# Patient Record
Sex: FEMALE | Race: WHITE | NOT HISPANIC OR LATINO
[De-identification: names, ages, dates, MRNs, and addresses within clinical notes are randomized per-mention and may not be internally consistent; named-entity substitution may affect disease eponyms.]

---

## 2017-03-07 ENCOUNTER — APPOINTMENT (OUTPATIENT)
Dept: OBGYN | Facility: CLINIC | Age: 27
End: 2017-03-07

## 2020-02-13 ENCOUNTER — APPOINTMENT (OUTPATIENT)
Dept: INTERNAL MEDICINE | Facility: CLINIC | Age: 30
End: 2020-02-13
Payer: COMMERCIAL

## 2020-02-13 VITALS
OXYGEN SATURATION: 98 % | SYSTOLIC BLOOD PRESSURE: 110 MMHG | TEMPERATURE: 98.4 F | HEART RATE: 76 BPM | DIASTOLIC BLOOD PRESSURE: 60 MMHG | HEIGHT: 61 IN

## 2020-02-13 PROCEDURE — 99385 PREV VISIT NEW AGE 18-39: CPT

## 2020-02-13 NOTE — ASSESSMENT
[FreeTextEntry1] : 29F, 9 weeks pregnant, with PMH of IBS presents for a first time visit to establish care.

## 2020-02-13 NOTE — HISTORY OF PRESENT ILLNESS
[FreeTextEntry1] : wellness visit [de-identified] : 29F, 9 weeks pregnant, with PMH of IBS (constipation type) presents for a first time visit to establish care. She is currently 9 weeks pregnant for the first time and is being appropriately followed by OB/GYN, Dr. Bravo. She says that currently she has no complaints or concerns and is feeling well. She denies symptoms of abdominal pain, cramping, nausea, or vomiting. She says that she used to take linzess for her IBS constipation but stopped since she became pregnant and is now controlling her diet. Otherwise, she is taking her prenatals and is currently not on any other medications.

## 2020-02-13 NOTE — PLAN
[FreeTextEntry1] : # pregnancy\par - currently 9 weeks pregnant and follows with OB/GYN, Dr. Bravo. Next visit being next week\par - denies any abdominal pain, cramping, nausea and vomiting\par - on prenatals\par \par # IBS\par - previously on linzess but stopped since she got pregnant\par - modifiable with diet changes, reports normal bowel movements every day now\par \par # HCM\par - flu shot done this year\par

## 2020-02-13 NOTE — PHYSICAL EXAM
[Normal] : normal rate, regular rhythm, normal S1 and S2 and no murmur heard [de-identified] : mild distention, consistant with pregnancy

## 2020-09-16 ENCOUNTER — TRANSCRIPTION ENCOUNTER (OUTPATIENT)
Age: 30
End: 2020-09-16

## 2020-09-17 ENCOUNTER — RESULT REVIEW (OUTPATIENT)
Age: 30
End: 2020-09-17

## 2020-09-17 ENCOUNTER — INPATIENT (INPATIENT)
Facility: HOSPITAL | Age: 30
LOS: 3 days | Discharge: HOME | End: 2020-09-21
Attending: OBSTETRICS & GYNECOLOGY | Admitting: OBSTETRICS & GYNECOLOGY
Payer: COMMERCIAL

## 2020-09-17 VITALS — WEIGHT: 138.89 LBS | HEIGHT: 61 IN

## 2020-09-17 LAB
ALBUMIN SERPL ELPH-MCNC: 3.9 G/DL — SIGNIFICANT CHANGE UP (ref 3.3–5)
ALP SERPL-CCNC: 116 U/L — SIGNIFICANT CHANGE UP (ref 40–120)
ALT FLD-CCNC: 12 U/L — SIGNIFICANT CHANGE UP (ref 10–45)
ANION GAP SERPL CALC-SCNC: 14 MMOL/L — SIGNIFICANT CHANGE UP (ref 5–17)
AST SERPL-CCNC: 19 U/L — SIGNIFICANT CHANGE UP (ref 10–40)
BASOPHILS # BLD AUTO: 0.03 K/UL — SIGNIFICANT CHANGE UP (ref 0–0.2)
BASOPHILS NFR BLD AUTO: 0.3 % — SIGNIFICANT CHANGE UP (ref 0–2)
BILIRUB SERPL-MCNC: 0.3 MG/DL — SIGNIFICANT CHANGE UP (ref 0.2–1.2)
BLD GP AB SCN SERPL QL: NEGATIVE — SIGNIFICANT CHANGE UP
BUN SERPL-MCNC: 16 MG/DL — SIGNIFICANT CHANGE UP (ref 7–23)
CALCIUM SERPL-MCNC: 10.2 MG/DL — SIGNIFICANT CHANGE UP (ref 8.4–10.5)
CHLORIDE SERPL-SCNC: 104 MMOL/L — SIGNIFICANT CHANGE UP (ref 96–108)
CO2 SERPL-SCNC: 19 MMOL/L — LOW (ref 22–31)
CREAT ?TM UR-MCNC: 18 MG/DL — SIGNIFICANT CHANGE UP
CREAT SERPL-MCNC: 0.51 MG/DL — SIGNIFICANT CHANGE UP (ref 0.5–1.3)
EOSINOPHIL # BLD AUTO: 0.11 K/UL — SIGNIFICANT CHANGE UP (ref 0–0.5)
EOSINOPHIL NFR BLD AUTO: 1.1 % — SIGNIFICANT CHANGE UP (ref 0–6)
GLUCOSE SERPL-MCNC: 109 MG/DL — HIGH (ref 70–99)
HCT VFR BLD CALC: 40.1 % — SIGNIFICANT CHANGE UP (ref 34.5–45)
HGB BLD-MCNC: 13.8 G/DL — SIGNIFICANT CHANGE UP (ref 11.5–15.5)
IMM GRANULOCYTES NFR BLD AUTO: 0.5 % — SIGNIFICANT CHANGE UP (ref 0–1.5)
LDH SERPL L TO P-CCNC: 160 U/L — SIGNIFICANT CHANGE UP (ref 50–242)
LYMPHOCYTES # BLD AUTO: 1.44 K/UL — SIGNIFICANT CHANGE UP (ref 1–3.3)
LYMPHOCYTES # BLD AUTO: 13.8 % — SIGNIFICANT CHANGE UP (ref 13–44)
MCHC RBC-ENTMCNC: 32.9 PG — SIGNIFICANT CHANGE UP (ref 27–34)
MCHC RBC-ENTMCNC: 34.4 GM/DL — SIGNIFICANT CHANGE UP (ref 32–36)
MCV RBC AUTO: 95.7 FL — SIGNIFICANT CHANGE UP (ref 80–100)
MONOCYTES # BLD AUTO: 0.6 K/UL — SIGNIFICANT CHANGE UP (ref 0–0.9)
MONOCYTES NFR BLD AUTO: 5.7 % — SIGNIFICANT CHANGE UP (ref 2–14)
NEUTROPHILS # BLD AUTO: 8.22 K/UL — HIGH (ref 1.8–7.4)
NEUTROPHILS NFR BLD AUTO: 78.6 % — HIGH (ref 43–77)
NRBC # BLD: 0 /100 WBCS — SIGNIFICANT CHANGE UP (ref 0–0)
PLATELET # BLD AUTO: 193 K/UL — SIGNIFICANT CHANGE UP (ref 150–400)
POTASSIUM SERPL-MCNC: 3.7 MMOL/L — SIGNIFICANT CHANGE UP (ref 3.5–5.3)
POTASSIUM SERPL-SCNC: 3.7 MMOL/L — SIGNIFICANT CHANGE UP (ref 3.5–5.3)
PROT ?TM UR-MCNC: <4 MG/DL — SIGNIFICANT CHANGE UP (ref 0–12)
PROT SERPL-MCNC: 7.1 G/DL — SIGNIFICANT CHANGE UP (ref 6–8.3)
PROT/CREAT UR-RTO: <0.2 RATIO — SIGNIFICANT CHANGE UP (ref 0–0.2)
RBC # BLD: 4.19 M/UL — SIGNIFICANT CHANGE UP (ref 3.8–5.2)
RBC # FLD: 12.4 % — SIGNIFICANT CHANGE UP (ref 10.3–14.5)
RH IG SCN BLD-IMP: POSITIVE — SIGNIFICANT CHANGE UP
SARS-COV-2 RNA SPEC QL NAA+PROBE: SIGNIFICANT CHANGE UP
SODIUM SERPL-SCNC: 137 MMOL/L — SIGNIFICANT CHANGE UP (ref 135–145)
URATE SERPL-MCNC: 4.4 MG/DL — SIGNIFICANT CHANGE UP (ref 2.5–7)
WBC # BLD: 10.45 K/UL — SIGNIFICANT CHANGE UP (ref 3.8–10.5)
WBC # FLD AUTO: 10.45 K/UL — SIGNIFICANT CHANGE UP (ref 3.8–10.5)

## 2020-09-17 PROCEDURE — 88307 TISSUE EXAM BY PATHOLOGIST: CPT | Mod: 26

## 2020-09-17 RX ORDER — ENOXAPARIN SODIUM 100 MG/ML
40 INJECTION SUBCUTANEOUS EVERY 24 HOURS
Refills: 0 | Status: DISCONTINUED | OUTPATIENT
Start: 2020-09-18 | End: 2020-09-21

## 2020-09-17 RX ORDER — OXYCODONE HYDROCHLORIDE 5 MG/1
5 TABLET ORAL
Refills: 0 | Status: DISCONTINUED | OUTPATIENT
Start: 2020-09-17 | End: 2020-09-21

## 2020-09-17 RX ORDER — POLYETHYLENE GLYCOL 3350 17 G/17G
17 POWDER, FOR SOLUTION ORAL DAILY
Refills: 0 | Status: DISCONTINUED | OUTPATIENT
Start: 2020-09-17 | End: 2020-09-21

## 2020-09-17 RX ORDER — CHLORHEXIDINE GLUCONATE 213 G/1000ML
1 SOLUTION TOPICAL ONCE
Refills: 0 | Status: DISCONTINUED | OUTPATIENT
Start: 2020-09-17 | End: 2020-09-21

## 2020-09-17 RX ORDER — LANOLIN
1 OINTMENT (GRAM) TOPICAL EVERY 6 HOURS
Refills: 0 | Status: DISCONTINUED | OUTPATIENT
Start: 2020-09-17 | End: 2020-09-21

## 2020-09-17 RX ORDER — FAMOTIDINE 10 MG/ML
20 INJECTION INTRAVENOUS ONCE
Refills: 0 | Status: COMPLETED | OUTPATIENT
Start: 2020-09-17 | End: 2020-09-17

## 2020-09-17 RX ORDER — FENTANYL/BUPIVACAINE/NS/PF 2MCG/ML-.1
250 PLASTIC BAG, INJECTION (ML) INJECTION
Refills: 0 | Status: DISCONTINUED | OUTPATIENT
Start: 2020-09-17 | End: 2020-09-17

## 2020-09-17 RX ORDER — SODIUM CHLORIDE 9 MG/ML
1000 INJECTION, SOLUTION INTRAVENOUS
Refills: 0 | Status: DISCONTINUED | OUTPATIENT
Start: 2020-09-17 | End: 2020-09-21

## 2020-09-17 RX ORDER — OXYTOCIN 10 UNIT/ML
1 VIAL (ML) INJECTION
Qty: 30 | Refills: 0 | Status: DISCONTINUED | OUTPATIENT
Start: 2020-09-17 | End: 2020-09-21

## 2020-09-17 RX ORDER — DIPHENHYDRAMINE HCL 50 MG
25 CAPSULE ORAL EVERY 6 HOURS
Refills: 0 | Status: DISCONTINUED | OUTPATIENT
Start: 2020-09-17 | End: 2020-09-21

## 2020-09-17 RX ORDER — SIMETHICONE 80 MG/1
80 TABLET, CHEWABLE ORAL EVERY 4 HOURS
Refills: 0 | Status: DISCONTINUED | OUTPATIENT
Start: 2020-09-17 | End: 2020-09-21

## 2020-09-17 RX ORDER — OXYCODONE HYDROCHLORIDE 5 MG/1
5 TABLET ORAL ONCE
Refills: 0 | Status: DISCONTINUED | OUTPATIENT
Start: 2020-09-17 | End: 2020-09-21

## 2020-09-17 RX ORDER — KETOROLAC TROMETHAMINE 30 MG/ML
30 SYRINGE (ML) INJECTION EVERY 6 HOURS
Refills: 0 | Status: DISCONTINUED | OUTPATIENT
Start: 2020-09-17 | End: 2020-09-18

## 2020-09-17 RX ORDER — SODIUM CHLORIDE 9 MG/ML
1000 INJECTION, SOLUTION INTRAVENOUS
Refills: 0 | Status: DISCONTINUED | OUTPATIENT
Start: 2020-09-17 | End: 2020-09-17

## 2020-09-17 RX ORDER — CITRIC ACID/SODIUM CITRATE 300-500 MG
30 SOLUTION, ORAL ORAL ONCE
Refills: 0 | Status: COMPLETED | OUTPATIENT
Start: 2020-09-17 | End: 2020-09-17

## 2020-09-17 RX ORDER — TETANUS TOXOID, REDUCED DIPHTHERIA TOXOID AND ACELLULAR PERTUSSIS VACCINE, ADSORBED 5; 2.5; 8; 8; 2.5 [IU]/.5ML; [IU]/.5ML; UG/.5ML; UG/.5ML; UG/.5ML
0.5 SUSPENSION INTRAMUSCULAR ONCE
Refills: 0 | Status: DISCONTINUED | OUTPATIENT
Start: 2020-09-17 | End: 2020-09-21

## 2020-09-17 RX ORDER — CEFAZOLIN SODIUM 1 G
2000 VIAL (EA) INJECTION ONCE
Refills: 0 | Status: COMPLETED | OUTPATIENT
Start: 2020-09-17 | End: 2020-09-17

## 2020-09-17 RX ORDER — OXYTOCIN 10 UNIT/ML
VIAL (ML) INJECTION
Qty: 20 | Refills: 0 | Status: DISCONTINUED | OUTPATIENT
Start: 2020-09-17 | End: 2020-09-17

## 2020-09-17 RX ORDER — CITRIC ACID/SODIUM CITRATE 300-500 MG
30 SOLUTION, ORAL ORAL ONCE
Refills: 0 | Status: DISCONTINUED | OUTPATIENT
Start: 2020-09-17 | End: 2020-09-21

## 2020-09-17 RX ORDER — ACETAMINOPHEN 500 MG
975 TABLET ORAL
Refills: 0 | Status: DISCONTINUED | OUTPATIENT
Start: 2020-09-17 | End: 2020-09-21

## 2020-09-17 RX ORDER — MAGNESIUM HYDROXIDE 400 MG/1
30 TABLET, CHEWABLE ORAL
Refills: 0 | Status: DISCONTINUED | OUTPATIENT
Start: 2020-09-17 | End: 2020-09-21

## 2020-09-17 RX ORDER — IBUPROFEN 200 MG
600 TABLET ORAL EVERY 6 HOURS
Refills: 0 | Status: COMPLETED | OUTPATIENT
Start: 2020-09-17 | End: 2021-08-16

## 2020-09-17 RX ORDER — OXYTOCIN 10 UNIT/ML
333.33 VIAL (ML) INJECTION
Qty: 20 | Refills: 0 | Status: DISCONTINUED | OUTPATIENT
Start: 2020-09-17 | End: 2020-09-21

## 2020-09-17 RX ORDER — AZITHROMYCIN 500 MG/1
500 TABLET, FILM COATED ORAL ONCE
Refills: 0 | Status: COMPLETED | OUTPATIENT
Start: 2020-09-17 | End: 2020-09-17

## 2020-09-17 RX ADMIN — SODIUM CHLORIDE 125 MILLILITER(S): 9 INJECTION, SOLUTION INTRAVENOUS at 13:02

## 2020-09-17 RX ADMIN — Medication 100 MILLIGRAM(S): at 19:21

## 2020-09-17 RX ADMIN — FAMOTIDINE 20 MILLIGRAM(S): 10 INJECTION INTRAVENOUS at 19:32

## 2020-09-17 RX ADMIN — AZITHROMYCIN 255 MILLIGRAM(S): 500 TABLET, FILM COATED ORAL at 19:45

## 2020-09-17 RX ADMIN — Medication 30 MILLILITER(S): at 19:20

## 2020-09-17 RX ADMIN — Medication 1 MILLIUNIT(S)/MIN: at 17:38

## 2020-09-17 RX ADMIN — Medication 30 MILLIGRAM(S): at 23:43

## 2020-09-17 RX ADMIN — Medication 975 MILLIGRAM(S): at 23:49

## 2020-09-17 RX ADMIN — Medication 30 MILLIGRAM(S): at 22:43

## 2020-09-17 NOTE — PATIENT PROFILE OB - NUTRITION PROFILE
[Tinnitus - Grade 0] : Tinnitus - Grade 0 [Blurred Vision: Grade 0] : Blurred Vision: Grade 0 [Mucositis Oral: Grade 0] : Mucositis Oral: Grade 0  [Xerostomia: Grade 1 - Symptomatic (e.g., dry or thick saliva) without significant dietary alteration; unstimulated saliva flow >0.2 ml/min] : Xerostomia: Grade 1 - Symptomatic (e.g., dry or thick saliva) without significant dietary alteration; unstimulated saliva flow >0.2 ml/min [Oral Pain: Grade 1 - Mild pain] : Oral Pain: Grade 1 - Mild pain [Salivary duct inflammation: Grade 0] : Salivary duct inflammation: Grade 0 [Dysgeusia: Grade 0] : Dysgeusia: Grade 0 [Alopecia: Grade 0] : Alopecia: Grade 0 [Pruritus: Grade 0] : Pruritus: Grade 0 [Skin Atrophy: Grade 0] : Skin Atrophy: Grade 0 [Skin Hyperpigmentation: Grade 0] : Skin Hyperpigmentation: Grade 0 [Skin Induration: Grade 0] : Skin Induration: Grade 0 [Dermatitis Radiation: Grade 0] : Dermatitis Radiation: Grade 0 no indicators present

## 2020-09-18 ENCOUNTER — TRANSCRIPTION ENCOUNTER (OUTPATIENT)
Age: 30
End: 2020-09-18

## 2020-09-18 LAB
BASOPHILS # BLD AUTO: 0.02 K/UL — SIGNIFICANT CHANGE UP (ref 0–0.2)
BASOPHILS NFR BLD AUTO: 0.1 % — SIGNIFICANT CHANGE UP (ref 0–2)
EOSINOPHIL # BLD AUTO: 0 K/UL — SIGNIFICANT CHANGE UP (ref 0–0.5)
EOSINOPHIL NFR BLD AUTO: 0 % — SIGNIFICANT CHANGE UP (ref 0–6)
HCT VFR BLD CALC: 32.3 % — LOW (ref 34.5–45)
HGB BLD-MCNC: 11.2 G/DL — LOW (ref 11.5–15.5)
IMM GRANULOCYTES NFR BLD AUTO: 0.7 % — SIGNIFICANT CHANGE UP (ref 0–1.5)
LYMPHOCYTES # BLD AUTO: 1.15 K/UL — SIGNIFICANT CHANGE UP (ref 1–3.3)
LYMPHOCYTES # BLD AUTO: 6.1 % — LOW (ref 13–44)
MCHC RBC-ENTMCNC: 33.3 PG — SIGNIFICANT CHANGE UP (ref 27–34)
MCHC RBC-ENTMCNC: 34.7 GM/DL — SIGNIFICANT CHANGE UP (ref 32–36)
MCV RBC AUTO: 96.1 FL — SIGNIFICANT CHANGE UP (ref 80–100)
MONOCYTES # BLD AUTO: 0.84 K/UL — SIGNIFICANT CHANGE UP (ref 0–0.9)
MONOCYTES NFR BLD AUTO: 4.5 % — SIGNIFICANT CHANGE UP (ref 2–14)
NEUTROPHILS # BLD AUTO: 16.61 K/UL — HIGH (ref 1.8–7.4)
NEUTROPHILS NFR BLD AUTO: 88.6 % — HIGH (ref 43–77)
NRBC # BLD: 0 /100 WBCS — SIGNIFICANT CHANGE UP (ref 0–0)
PLATELET # BLD AUTO: 160 K/UL — SIGNIFICANT CHANGE UP (ref 150–400)
RBC # BLD: 3.36 M/UL — LOW (ref 3.8–5.2)
RBC # FLD: 12.1 % — SIGNIFICANT CHANGE UP (ref 10.3–14.5)
SARS-COV-2 IGG SERPL QL IA: NEGATIVE — SIGNIFICANT CHANGE UP
SARS-COV-2 IGM SERPL IA-ACNC: 0.3 RATIO — SIGNIFICANT CHANGE UP
T PALLIDUM AB TITR SER: NEGATIVE — SIGNIFICANT CHANGE UP
WBC # BLD: 18.75 K/UL — HIGH (ref 3.8–10.5)
WBC # FLD AUTO: 18.75 K/UL — HIGH (ref 3.8–10.5)

## 2020-09-18 RX ORDER — POLYETHYLENE GLYCOL 3350 17 G/17G
17 POWDER, FOR SOLUTION ORAL DAILY
Refills: 0 | Status: DISCONTINUED | OUTPATIENT
Start: 2020-09-18 | End: 2020-09-21

## 2020-09-18 RX ORDER — IBUPROFEN 200 MG
600 TABLET ORAL EVERY 6 HOURS
Refills: 0 | Status: DISCONTINUED | OUTPATIENT
Start: 2020-09-18 | End: 2020-09-21

## 2020-09-18 RX ORDER — IBUPROFEN 200 MG
1 TABLET ORAL
Qty: 0 | Refills: 0 | DISCHARGE
Start: 2020-09-18

## 2020-09-18 RX ORDER — ACETAMINOPHEN 500 MG
3 TABLET ORAL
Qty: 0 | Refills: 0 | DISCHARGE
Start: 2020-09-18

## 2020-09-18 RX ORDER — KETOROLAC TROMETHAMINE 30 MG/ML
30 SYRINGE (ML) INJECTION ONCE
Refills: 0 | Status: DISCONTINUED | OUTPATIENT
Start: 2020-09-18 | End: 2020-09-21

## 2020-09-18 RX ORDER — SENNA PLUS 8.6 MG/1
2 TABLET ORAL AT BEDTIME
Refills: 0 | Status: DISCONTINUED | OUTPATIENT
Start: 2020-09-18 | End: 2020-09-21

## 2020-09-18 RX ADMIN — Medication 30 MILLIGRAM(S): at 09:22

## 2020-09-18 RX ADMIN — Medication 1 TABLET(S): at 23:33

## 2020-09-18 RX ADMIN — Medication 30 MILLIGRAM(S): at 10:25

## 2020-09-18 RX ADMIN — Medication 975 MILLIGRAM(S): at 13:00

## 2020-09-18 RX ADMIN — SODIUM CHLORIDE 125 MILLILITER(S): 9 INJECTION, SOLUTION INTRAVENOUS at 01:45

## 2020-09-18 RX ADMIN — Medication 600 MILLIGRAM(S): at 21:39

## 2020-09-18 RX ADMIN — Medication 975 MILLIGRAM(S): at 12:13

## 2020-09-18 RX ADMIN — Medication 30 MILLIGRAM(S): at 15:04

## 2020-09-18 RX ADMIN — SIMETHICONE 80 MILLIGRAM(S): 80 TABLET, CHEWABLE ORAL at 16:44

## 2020-09-18 RX ADMIN — Medication 975 MILLIGRAM(S): at 06:30

## 2020-09-18 RX ADMIN — Medication 600 MILLIGRAM(S): at 20:39

## 2020-09-18 RX ADMIN — Medication 975 MILLIGRAM(S): at 07:00

## 2020-09-18 RX ADMIN — Medication 975 MILLIGRAM(S): at 19:45

## 2020-09-18 RX ADMIN — ENOXAPARIN SODIUM 40 MILLIGRAM(S): 100 INJECTION SUBCUTANEOUS at 09:21

## 2020-09-18 RX ADMIN — Medication 975 MILLIGRAM(S): at 18:44

## 2020-09-18 RX ADMIN — Medication 30 MILLIGRAM(S): at 04:05

## 2020-09-18 RX ADMIN — Medication 975 MILLIGRAM(S): at 23:33

## 2020-09-18 RX ADMIN — Medication 30 MILLIGRAM(S): at 16:00

## 2020-09-18 RX ADMIN — Medication 975 MILLIGRAM(S): at 00:19

## 2020-09-18 RX ADMIN — Medication 30 MILLIGRAM(S): at 04:20

## 2020-09-18 NOTE — DISCHARGE NOTE OB - PATIENT PORTAL LINK FT
You can access the FollowMyHealth Patient Portal offered by [facility] by registering at the following website: [email or web address]. By joining [facility]’s FollowMyHealth portal, you will also be able to view your health information using other applications (apps) compatible with our system.

## 2020-09-18 NOTE — DISCHARGE NOTE OB - MEDICATION SUMMARY - MEDICATIONS TO TAKE
I will START or STAY ON the medications listed below when I get home from the hospital:    acetaminophen 325 mg oral tablet  -- 3 tab(s) by mouth   -- Indication: For Pain    ibuprofen 600 mg oral tablet  -- 1 tab(s) by mouth every 6 hours  -- Indication: For Pain

## 2020-09-18 NOTE — LACTATION INITIAL EVALUATION - NS LACT CON REASON FOR REQ
primaparous mom Pt. is a P1 at 40 wks. gestatio via primary .  Pt. has hx of IBS-C  states she used Linzess prior to pregnancy but not during the pregnancy.  Pt. states she used Miralax during the pregnancy.  Baby is 16 hrs. old has had  2 voids, 2 stools, weight loss 3.12%.  At this time baby is very sleepy and difficult to elicit feeding cues.  Upon oral exam a lingual frenulum was palpated and briefly visualized but is difficult to assess.  Baby prefers to bite with gums and tongue movements are disorganized.   Baby is sleepy and unable to latch to breast at this time.  Discussed withpt. to attempt to latch and breastfeed if unable pt. instructed to hand express and feed to baby.  Pt. can finger feed or syringe feed colostrum to baby.   pt.comfortabel with hand expression and is able to express colostrum.  Pt. reports she was given hand pump to encourage eversion of left nipple but is now c/o of soreness./primaparous mom Pt. is a P1 at 40 wks. gestation via primary .  Pt. has hx of IBS-C  states she used Linzess prior to pregnancy but not during the pregnancy.  Pt. states she used Miralax during the pregnancy.  Baby is 16 hrs. old has had  2 voids, 2 stools, weight loss 3.12%.  At this time baby is very sleepy and is not showing feeding cues.  Upon oral exam a lingual frenulum was palpated and briefly visualized but unable to assess if lingual frenulum will interfere with breastfeeding.  Baby prefers to bite with gums and tongue movements are disorganized.   Baby is sleepy and unable to latch to breast at this time.  Discussed with pt. to attempt to latch and breastfeed if unable pt. instructed to hand express and feed to baby.  Pt. can finger feed or syringe feed colostrum to baby.   Pt. comfortable with hand expression and is able to express colostrum.  Pt. reports she was given hand pump to encourage eversion of left nipple but is now c/o of soreness.  Pt.will also hand express from left breast as she can tolerate./primaparous mom

## 2020-09-18 NOTE — DISCHARGE NOTE OB - MATERIALS PROVIDED
[facility] Hearing Screen Program/Vaccinations/[facility]  Screening Program/Back To Sleep Handout/Birth Certificate Instructions/Breastfeeding Mother’s Support Group Information/Guide to Postpartum Care/Discharge Medication Information for Patients and Families Pocket Guide/Shaken Baby Prevention Handout/Breastfeeding Guide and Packet/Tdap Vaccination (VIS Pub Date: 2012)/[place]  Immunization Record/Breastfeeding Log/Bottle Feeding Log

## 2020-09-18 NOTE — PROGRESS NOTE ADULT - SUBJECTIVE AND OBJECTIVE BOX
Patient evaluated at bedside.   She reports pain is well controlled. Dobson in place. passing flatus.  Not OOB yet.  She denies HA, dizziness, CP, palpitations, SOB, n/v, or heavy vaginal bleeding.    Physical Exam:  Vital Signs Last 24 Hrs  T(C): 36.6 (18 Sep 2020 06:00), Max: 36.9 (17 Sep 2020 23:45)  T(F): 97.9 (18 Sep 2020 06:00), Max: 98.4 (17 Sep 2020 23:45)  HR: 62 (18 Sep 2020 06:00) (62 - 98)  BP: 92/58 (18 Sep 2020 06:00) (92/58 - 113/62)  BP(mean): 84 (17 Sep 2020 22:30) (76 - 94)  RR: 17 (18 Sep 2020 06:00) (15 - 22)  SpO2: 96% (18 Sep 2020 06:00) (95% - 100%)    Gen: NAD  Abd: + BS, soft, nontender, nondistended, no rebound or guarding  compression dressing removed, Incision clean, dry and intact  uterus firm at midline at the level of the umbilicus  : lochia WNL  Extremities: no swelling or calf tenderness                          11.2   18.75 )-----------( 160      ( 18 Sep 2020 06:16 )             32.3     09-17    137  |  104  |  16  ----------------------------<  109<H>  3.7   |  19<L>  |  0.51    Ca    10.2      17 Sep 2020 13:10    TPro  7.1  /  Alb  3.9  /  TBili  0.3  /  DBili  x   /  AST  19  /  ALT  12  /  AlkPhos  116  09-17

## 2020-09-18 NOTE — PROGRESS NOTE ADULT - ASSESSMENT
30y Female POD#1    s/p C/S, Uncomplicated                                       1. Neuro/Pain:  toradol atc, tylenol atc, oxy prn  2  CV:  VS per routine, AM CBC pending  3. Pulm: Encourage ISS & Ambulation  4. GI:  may try regular diet now that she is passing flatus  5. : Dobson in place, to be removed this morning, TOV this afternoon  6. DVT ppx: SCDs, SQH 5000 mg BID  7. Dispo: POD #3 or #4

## 2020-09-18 NOTE — LACTATION INITIAL EVALUATION - LACTATION INTERVENTIONS
attempt to latch baby to breast every three hrs.  if unable pt. is to hand express colostrum and finger feed to baby or feed via syringe until 24 hrs./initiate skin to skin/initiate hand expression routine

## 2020-09-18 NOTE — LACTATION INITIAL EVALUATION - NIPPLE ASSESSMENT (LEFT)
flat/inverted/medium medium/can see nipple but will not bridger  with stimulation and turtles in with hand expression/flat/inverted

## 2020-09-18 NOTE — DISCHARGE NOTE OB - CARE PROVIDER_API CALL
Cindy Bravo)  Obstetrics and Gynecology  [address] [place] [address]  Phone: [phone]  Fax: [phone]  Follow Up Time:

## 2020-09-18 NOTE — LACTATION INITIAL EVALUATION - ORAL/MOTOR ACTIVITY
disorganized jaw/difficult to elicit suck, does bite with gums, palpated lingual frenulum, unable to determine if lingual frenulum interferes with breastfeeding at this time/disorganized tongue

## 2020-09-18 NOTE — DISCHARGE NOTE OB - CARE PLAN
Principal Discharge DX:	Postpartum state  Goal:	Feel well!  Assessment and plan of treatment:	 delivery, meeting all postoperative milestones.  Follow up in 1-2 weeks.

## 2020-09-19 RX ADMIN — POLYETHYLENE GLYCOL 3350 17 GRAM(S): 17 POWDER, FOR SOLUTION ORAL at 16:25

## 2020-09-19 RX ADMIN — Medication 600 MILLIGRAM(S): at 03:47

## 2020-09-19 RX ADMIN — Medication 975 MILLIGRAM(S): at 23:02

## 2020-09-19 RX ADMIN — SIMETHICONE 80 MILLIGRAM(S): 80 TABLET, CHEWABLE ORAL at 23:54

## 2020-09-19 RX ADMIN — Medication 975 MILLIGRAM(S): at 22:02

## 2020-09-19 RX ADMIN — Medication 975 MILLIGRAM(S): at 05:28

## 2020-09-19 RX ADMIN — Medication 1 TABLET(S): at 22:02

## 2020-09-19 RX ADMIN — Medication 975 MILLIGRAM(S): at 15:30

## 2020-09-19 RX ADMIN — ENOXAPARIN SODIUM 40 MILLIGRAM(S): 100 INJECTION SUBCUTANEOUS at 09:58

## 2020-09-19 RX ADMIN — Medication 600 MILLIGRAM(S): at 04:17

## 2020-09-19 RX ADMIN — POLYETHYLENE GLYCOL 3350 17 GRAM(S): 17 POWDER, FOR SOLUTION ORAL at 14:45

## 2020-09-19 RX ADMIN — Medication 600 MILLIGRAM(S): at 17:51

## 2020-09-19 RX ADMIN — Medication 600 MILLIGRAM(S): at 13:00

## 2020-09-19 RX ADMIN — SIMETHICONE 80 MILLIGRAM(S): 80 TABLET, CHEWABLE ORAL at 14:45

## 2020-09-19 RX ADMIN — Medication 975 MILLIGRAM(S): at 10:45

## 2020-09-19 RX ADMIN — Medication 975 MILLIGRAM(S): at 05:58

## 2020-09-19 RX ADMIN — Medication 975 MILLIGRAM(S): at 14:48

## 2020-09-19 RX ADMIN — Medication 600 MILLIGRAM(S): at 18:30

## 2020-09-19 RX ADMIN — Medication 600 MILLIGRAM(S): at 12:10

## 2020-09-19 RX ADMIN — Medication 975 MILLIGRAM(S): at 00:33

## 2020-09-19 RX ADMIN — SIMETHICONE 80 MILLIGRAM(S): 80 TABLET, CHEWABLE ORAL at 09:57

## 2020-09-19 RX ADMIN — Medication 975 MILLIGRAM(S): at 09:58

## 2020-09-19 RX ADMIN — Medication 600 MILLIGRAM(S): at 23:53

## 2020-09-19 NOTE — PROGRESS NOTE ADULT - SUBJECTIVE AND OBJECTIVE BOX
Patient evaluated at bedside.   She reports pain is well controlled with OPM.  She has been ambulating without assistance, voiding spontaneously, passing gas, had a BM, tolerating regular diet.  She denies HA, dizziness, CP, palpitations, SOB, n/v, or heavy vaginal bleeding.    Physical Exam:  Vital Signs Last 24 Hrs  T(C): 36.7 (19 Sep 2020 05:30), Max: 36.9 (18 Sep 2020 14:02)  T(F): 98 (19 Sep 2020 05:30), Max: 98.5 (18 Sep 2020 18:30)  HR: 85 (19 Sep 2020 05:30) (65 - 88)  BP: 103/66 (19 Sep 2020 05:30) (97/62 - 107/69)  RR: 17 (19 Sep 2020 05:30) (16 - 18)  SpO2: 97% (19 Sep 2020 05:30) (96% - 97%)    Gen: NAD  Abd: + BS, soft, nontender, nondistended, no rebound or guarding  Incision clean, dry and intact  uterus firm at midline at the level of the umbilicus  : lochia WNL  Extremities: no swelling or calf tenderness                          11.2   18.75 )-----------( 160      ( 18 Sep 2020 06:16 )             32.3     09-17    137  |  104  |  16  ----------------------------<  109<H>  3.7   |  19<L>  |  0.51    Ca    10.2      17 Sep 2020 13:10    TPro  7.1  /  Alb  3.9  /  TBili  0.3  /  DBili  x   /  AST  19  /  ALT  12  /  AlkPhos  116  09-17

## 2020-09-20 RX ADMIN — Medication 975 MILLIGRAM(S): at 22:15

## 2020-09-20 RX ADMIN — Medication 975 MILLIGRAM(S): at 15:30

## 2020-09-20 RX ADMIN — ENOXAPARIN SODIUM 40 MILLIGRAM(S): 100 INJECTION SUBCUTANEOUS at 09:41

## 2020-09-20 RX ADMIN — Medication 975 MILLIGRAM(S): at 04:30

## 2020-09-20 RX ADMIN — Medication 600 MILLIGRAM(S): at 12:45

## 2020-09-20 RX ADMIN — Medication 600 MILLIGRAM(S): at 18:37

## 2020-09-20 RX ADMIN — Medication 975 MILLIGRAM(S): at 14:27

## 2020-09-20 RX ADMIN — Medication 600 MILLIGRAM(S): at 12:06

## 2020-09-20 RX ADMIN — Medication 975 MILLIGRAM(S): at 21:34

## 2020-09-20 RX ADMIN — Medication 975 MILLIGRAM(S): at 09:39

## 2020-09-20 RX ADMIN — Medication 600 MILLIGRAM(S): at 00:23

## 2020-09-20 RX ADMIN — POLYETHYLENE GLYCOL 3350 17 GRAM(S): 17 POWDER, FOR SOLUTION ORAL at 09:41

## 2020-09-20 RX ADMIN — Medication 975 MILLIGRAM(S): at 10:30

## 2020-09-20 RX ADMIN — Medication 600 MILLIGRAM(S): at 06:30

## 2020-09-20 RX ADMIN — Medication 975 MILLIGRAM(S): at 04:00

## 2020-09-20 RX ADMIN — Medication 600 MILLIGRAM(S): at 19:15

## 2020-09-20 NOTE — PROGRESS NOTE ADULT - ASSESSMENT
30y Female POD#3    s/p C/S, Uncomplicated                                       1. Neuro/Pain:  OPM  2  CV:  VS per routine  3. Pulm: Encourage ISS & Ambulation  4. GI:  Reg  5. : Voiding  6. DVT ppx: SCDs, SQH 5000 mg BID  7. Dispo: POD #3 or #4

## 2020-09-20 NOTE — PROGRESS NOTE ADULT - SUBJECTIVE AND OBJECTIVE BOX
Patient evaluated at bedside.   She reports pain is well controlled with OPM.  She has been ambulating without assistance, voiding spontaneously, passing gas, tolerating regular diet.  She denies HA, dizziness, CP, palpitations, SOB, n/v, or heavy vaginal bleeding.    Physical Exam:  Vital Signs Last 24 Hrs  T(C): 36.8 (20 Sep 2020 07:00), Max: 37.2 (19 Sep 2020 17:57)  T(F): 98.2 (20 Sep 2020 07:00), Max: 99 (19 Sep 2020 17:57)  HR: 79 (20 Sep 2020 07:00) (79 - 94)  BP: 113/74 (20 Sep 2020 07:00) (107/72 - 113/74)  RR: 18 (20 Sep 2020 07:00) (17 - 18)  SpO2: 98% (20 Sep 2020 07:00) (98% - 98%)    Gen: NAD  Abd: + BS, soft, nontender, nondistended, no rebound or guarding  Incision clean, dry and intact  uterus firm at midline 1 fingerbreadth below the umbilicus  : lochia WNL  Extremities: no swelling or calf tenderness

## 2020-09-21 VITALS
DIASTOLIC BLOOD PRESSURE: 83 MMHG | SYSTOLIC BLOOD PRESSURE: 127 MMHG | HEART RATE: 104 BPM | OXYGEN SATURATION: 98 % | TEMPERATURE: 98 F | RESPIRATION RATE: 16 BRPM

## 2020-09-21 PROCEDURE — 80053 COMPREHEN METABOLIC PANEL: CPT

## 2020-09-21 PROCEDURE — 88307 TISSUE EXAM BY PATHOLOGIST: CPT

## 2020-09-21 PROCEDURE — 85025 COMPLETE CBC W/AUTO DIFF WBC: CPT

## 2020-09-21 PROCEDURE — 86900 BLOOD TYPING SEROLOGIC ABO: CPT

## 2020-09-21 PROCEDURE — 86901 BLOOD TYPING SEROLOGIC RH(D): CPT

## 2020-09-21 PROCEDURE — 86850 RBC ANTIBODY SCREEN: CPT

## 2020-09-21 PROCEDURE — 82570 ASSAY OF URINE CREATININE: CPT

## 2020-09-21 PROCEDURE — 83615 LACTATE (LD) (LDH) ENZYME: CPT

## 2020-09-21 PROCEDURE — 86780 TREPONEMA PALLIDUM: CPT

## 2020-09-21 PROCEDURE — U0003: CPT

## 2020-09-21 PROCEDURE — 86769 SARS-COV-2 COVID-19 ANTIBODY: CPT

## 2020-09-21 PROCEDURE — 84156 ASSAY OF PROTEIN URINE: CPT

## 2020-09-21 PROCEDURE — 84550 ASSAY OF BLOOD/URIC ACID: CPT

## 2020-09-21 PROCEDURE — 36415 COLL VENOUS BLD VENIPUNCTURE: CPT

## 2020-09-21 RX ADMIN — Medication 600 MILLIGRAM(S): at 16:50

## 2020-09-21 RX ADMIN — Medication 600 MILLIGRAM(S): at 10:30

## 2020-09-21 RX ADMIN — Medication 975 MILLIGRAM(S): at 05:14

## 2020-09-21 RX ADMIN — Medication 600 MILLIGRAM(S): at 01:15

## 2020-09-21 RX ADMIN — POLYETHYLENE GLYCOL 3350 17 GRAM(S): 17 POWDER, FOR SOLUTION ORAL at 16:25

## 2020-09-21 RX ADMIN — Medication 600 MILLIGRAM(S): at 09:57

## 2020-09-21 RX ADMIN — Medication 600 MILLIGRAM(S): at 16:20

## 2020-09-21 RX ADMIN — POLYETHYLENE GLYCOL 3350 17 GRAM(S): 17 POWDER, FOR SOLUTION ORAL at 09:57

## 2020-09-21 RX ADMIN — Medication 600 MILLIGRAM(S): at 00:26

## 2020-09-21 RX ADMIN — Medication 975 MILLIGRAM(S): at 13:09

## 2020-09-21 RX ADMIN — Medication 975 MILLIGRAM(S): at 13:40

## 2020-09-21 RX ADMIN — ENOXAPARIN SODIUM 40 MILLIGRAM(S): 100 INJECTION SUBCUTANEOUS at 09:56

## 2020-09-21 NOTE — PROGRESS NOTE ADULT - ASSESSMENT
30y Female POD#4 s/p C/S, Uncomplicated                                     1. Neuro/Pain: OPM  2  CV:  VS per routine  3. Pulm: Encourage ISS & Ambulation  4. GI:  Reg diet  5. : Voiding  6. DVT ppx: SCDs, SQH 5000 mg BID  7. Skin: rash likely d/t irritation from pressure dressing tape or chloroprep skin reaction. No fevers/chills. Not spreading or worsening overnight. Recommended benadryl or topical hydrocortisone cream.   7. Dispo: POD #3 or #4

## 2020-09-21 NOTE — PROGRESS NOTE ADULT - SUBJECTIVE AND OBJECTIVE BOX
Patient evaluated at bedside.   She reports pain is well controlled with OPM.   She has been ambulating without assistance, voiding spontaneously, passing gas, tolerating regular diet.  She denies HA, dizziness, CP, palpitations, SOB, n/v, or heavy vaginal bleeding.  Pt noted a raised erythematous itchy rash on her abdomen that has not gone away in a few days which has been bothering her. Pt does not feel it is expanding or worsening. No fevers/chills.    Physical Exam:  Vital Signs Last 24 Hrs  T(C): 36.8 (20 Sep 2020 07:00), Max: 37.2 (19 Sep 2020 17:57)  T(F): 98.2 (20 Sep 2020 07:00), Max: 99 (19 Sep 2020 17:57)  HR: 79 (20 Sep 2020 07:00) (79 - 94)  BP: 113/74 (20 Sep 2020 07:00) (107/72 - 113/74)  RR: 18 (20 Sep 2020 07:00) (17 - 18)  SpO2: 98% (20 Sep 2020 07:00) (98% - 98%)    Gen: NAD  Abd:  soft, nontender, nondistended, no rebound or guarding, fundus firm at midline  Incision clean, dry and intact, with steri strips in place  Erthematous bumpy rash noted across abdomen in the position where silk tape was applied as pressure dressing for  incision.   : lochia WNL  Extremities: no swelling or calf tenderness

## 2020-09-23 LAB — SURGICAL PATHOLOGY STUDY: SIGNIFICANT CHANGE UP

## 2020-09-25 DIAGNOSIS — Z86.19 PERSONAL HISTORY OF OTHER INFECTIOUS AND PARASITIC DISEASES: ICD-10-CM

## 2020-09-25 DIAGNOSIS — Z3A.40 40 WEEKS GESTATION OF PREGNANCY: ICD-10-CM

## 2020-09-25 DIAGNOSIS — O36.5930 MATERNAL CARE FOR OTHER KNOWN OR SUSPECTED POOR FETAL GROWTH, THIRD TRIMESTER, NOT APPLICABLE OR UNSPECIFIED: ICD-10-CM

## 2020-09-25 DIAGNOSIS — R21 RASH AND OTHER NONSPECIFIC SKIN ERUPTION: ICD-10-CM

## 2020-09-25 DIAGNOSIS — J30.2 OTHER SEASONAL ALLERGIC RHINITIS: ICD-10-CM

## 2020-09-25 DIAGNOSIS — O99.89 OTHER SPECIFIED DISEASES AND CONDITIONS COMPLICATING PREGNANCY, CHILDBIRTH AND THE PUERPERIUM: ICD-10-CM

## 2020-09-25 DIAGNOSIS — K58.1 IRRITABLE BOWEL SYNDROME WITH CONSTIPATION: ICD-10-CM

## 2021-02-07 ENCOUNTER — TRANSCRIPTION ENCOUNTER (OUTPATIENT)
Age: 31
End: 2021-02-07

## 2021-09-14 ENCOUNTER — APPOINTMENT (OUTPATIENT)
Dept: INTERNAL MEDICINE | Facility: CLINIC | Age: 31
End: 2021-09-14
Payer: COMMERCIAL

## 2021-09-14 VITALS
HEIGHT: 61 IN | TEMPERATURE: 98.2 F | BODY MASS INDEX: 19.83 KG/M2 | WEIGHT: 105 LBS | DIASTOLIC BLOOD PRESSURE: 70 MMHG | SYSTOLIC BLOOD PRESSURE: 100 MMHG | OXYGEN SATURATION: 98 % | HEART RATE: 86 BPM

## 2021-09-14 DIAGNOSIS — Z00.00 ENCOUNTER FOR GENERAL ADULT MEDICAL EXAMINATION W/OUT ABNORMAL FINDINGS: ICD-10-CM

## 2021-09-14 DIAGNOSIS — R53.81 OTHER MALAISE: ICD-10-CM

## 2021-09-14 DIAGNOSIS — Z23 ENCOUNTER FOR IMMUNIZATION: ICD-10-CM

## 2021-09-14 DIAGNOSIS — Z78.9 OTHER SPECIFIED HEALTH STATUS: ICD-10-CM

## 2021-09-14 DIAGNOSIS — R53.83 OTHER MALAISE: ICD-10-CM

## 2021-09-14 PROCEDURE — 36415 COLL VENOUS BLD VENIPUNCTURE: CPT

## 2021-09-14 PROCEDURE — 90471 IMMUNIZATION ADMIN: CPT

## 2021-09-14 PROCEDURE — 99395 PREV VISIT EST AGE 18-39: CPT | Mod: 25

## 2021-09-14 PROCEDURE — 90686 IIV4 VACC NO PRSV 0.5 ML IM: CPT

## 2021-09-14 NOTE — PLAN
[FreeTextEntry1] : wellness complete\par labs today\par reviewed healthy sleep habits\par referral for ortho hand eval for his left wrist.   \par fluvax today.  \par \par ibs c has been stable off linzess

## 2021-09-14 NOTE — HISTORY OF PRESENT ILLNESS
[de-identified] : Here for wellness\par  back to work for past 6 months, has a 2yo at home.  not sleeeping well.  more anxiety Seeing a therapist as well.  Also a couples therapist.\par Baby is sleeping well.  \par Eating well.  \par Recent left wrist injury- was doing yoga and pilates- had seen ortho hand for a quick visit   \par sleep habits are ok, does have a tv, dog goes in and out.  \par Still breast feeding.

## 2021-09-15 LAB
25(OH)D3 SERPL-MCNC: 37.1 NG/ML
ALBUMIN SERPL ELPH-MCNC: 5.2 G/DL
ALP BLD-CCNC: 80 U/L
ALT SERPL-CCNC: 15 U/L
ANION GAP SERPL CALC-SCNC: 12 MMOL/L
AST SERPL-CCNC: 15 U/L
BASOPHILS # BLD AUTO: 0.02 K/UL
BASOPHILS NFR BLD AUTO: 0.3 %
BILIRUB SERPL-MCNC: 0.6 MG/DL
BUN SERPL-MCNC: 22 MG/DL
CALCIUM SERPL-MCNC: 9.9 MG/DL
CHLORIDE SERPL-SCNC: 105 MMOL/L
CHOLEST SERPL-MCNC: 165 MG/DL
CO2 SERPL-SCNC: 23 MMOL/L
CREAT SERPL-MCNC: 0.61 MG/DL
EOSINOPHIL # BLD AUTO: 0.09 K/UL
EOSINOPHIL NFR BLD AUTO: 1.2 %
ESTIMATED AVERAGE GLUCOSE: 100 MG/DL
FOLATE SERPL-MCNC: 14 NG/ML
GLUCOSE SERPL-MCNC: 91 MG/DL
HBA1C MFR BLD HPLC: 5.1 %
HCT VFR BLD CALC: 45.3 %
HDLC SERPL-MCNC: 80 MG/DL
HGB BLD-MCNC: 14.7 G/DL
IMM GRANULOCYTES NFR BLD AUTO: 0.1 %
LDLC SERPL CALC-MCNC: 76 MG/DL
LYMPHOCYTES # BLD AUTO: 1.2 K/UL
LYMPHOCYTES NFR BLD AUTO: 15.3 %
MAN DIFF?: NORMAL
MCHC RBC-ENTMCNC: 31.9 PG
MCHC RBC-ENTMCNC: 32.5 GM/DL
MCV RBC AUTO: 98.3 FL
MONOCYTES # BLD AUTO: 0.65 K/UL
MONOCYTES NFR BLD AUTO: 8.3 %
NEUTROPHILS # BLD AUTO: 5.85 K/UL
NEUTROPHILS NFR BLD AUTO: 74.8 %
NONHDLC SERPL-MCNC: 84 MG/DL
PLATELET # BLD AUTO: 218 K/UL
POTASSIUM SERPL-SCNC: 4.4 MMOL/L
PROT SERPL-MCNC: 7.4 G/DL
RBC # BLD: 4.61 M/UL
RBC # FLD: 11.9 %
SODIUM SERPL-SCNC: 140 MMOL/L
TRIGL SERPL-MCNC: 42 MG/DL
TSH SERPL-ACNC: 0.95 UIU/ML
VIT B12 SERPL-MCNC: 1050 PG/ML
WBC # FLD AUTO: 7.82 K/UL

## 2021-09-16 ENCOUNTER — NON-APPOINTMENT (OUTPATIENT)
Age: 31
End: 2021-09-16

## 2021-09-16 ENCOUNTER — APPOINTMENT (OUTPATIENT)
Dept: ORTHOPEDIC SURGERY | Facility: CLINIC | Age: 31
End: 2021-09-16
Payer: COMMERCIAL

## 2021-09-16 VITALS — WEIGHT: 105 LBS | RESPIRATION RATE: 16 BRPM | HEIGHT: 61 IN | BODY MASS INDEX: 19.83 KG/M2

## 2021-09-16 PROCEDURE — 76882 US LMTD JT/FCL EVL NVASC XTR: CPT

## 2021-09-16 PROCEDURE — 99024 POSTOP FOLLOW-UP VISIT: CPT

## 2021-09-16 PROCEDURE — 20612 ASPIRATE/INJ GANGLION CYST: CPT | Mod: LT

## 2021-09-16 PROCEDURE — 73110 X-RAY EXAM OF WRIST: CPT | Mod: 50

## 2021-09-16 RX ORDER — UBIDECARENONE/VIT E ACET 100MG-5
CAPSULE ORAL
Refills: 0 | Status: ACTIVE | COMMUNITY

## 2021-09-16 RX ORDER — PNV NO.95/FERROUS FUM/FOLIC AC 28MG-0.8MG
TABLET ORAL
Refills: 0 | Status: ACTIVE | COMMUNITY

## 2022-03-30 ENCOUNTER — NON-APPOINTMENT (OUTPATIENT)
Age: 32
End: 2022-03-30

## 2022-05-21 ENCOUNTER — NON-APPOINTMENT (OUTPATIENT)
Age: 32
End: 2022-05-21

## 2022-06-30 ENCOUNTER — NON-APPOINTMENT (OUTPATIENT)
Age: 32
End: 2022-06-30

## 2022-07-01 RX ORDER — NIRMATRELVIR AND RITONAVIR 300-100 MG
20 X 150 MG & KIT ORAL
Qty: 1 | Refills: 0 | Status: ACTIVE | COMMUNITY
Start: 2022-07-01 | End: 1900-01-01

## 2022-07-21 ENCOUNTER — APPOINTMENT (OUTPATIENT)
Dept: ORTHOPEDIC SURGERY | Facility: CLINIC | Age: 32
End: 2022-07-21

## 2022-07-21 VITALS — HEIGHT: 61 IN | WEIGHT: 105 LBS | RESPIRATION RATE: 16 BRPM | BODY MASS INDEX: 19.83 KG/M2

## 2022-07-21 DIAGNOSIS — M25.832 OTHER SPECIFIED JOINT DISORDERS, LEFT WRIST: ICD-10-CM

## 2022-07-21 PROCEDURE — 20612 ASPIRATE/INJ GANGLION CYST: CPT

## 2022-07-21 PROCEDURE — 99213 OFFICE O/P EST LOW 20 MIN: CPT | Mod: 25

## 2022-11-25 ENCOUNTER — NON-APPOINTMENT (OUTPATIENT)
Age: 32
End: 2022-11-25

## 2023-02-16 NOTE — PATIENT PROFILE OB - DOES PATIENT HAVE ADVANCE DIRECTIVE
Yes Xolair Pregnancy And Lactation Text: This medication is Pregnancy Category B and is considered safe during pregnancy. This medication is excreted in breast milk.

## 2024-07-25 NOTE — DISCHARGE NOTE OB - NS AS DC FU INST LIST INST
Plastic and Reconstructive Surgery Referral Received     Ordering provider: Jazzy Ceron MD      Diagnosis:   Macromastia [N62]     Additional Information (past imaging, surgeries, etc):  Current BMI is 31.35, weight has been stable for at least 6 months.   Social history reports that patient's smoking status is a former smoker, she has quit as of February 29 2024.  Patient's will need to not smoke for 6 weeks prior to surgery.  (Nicotine test 2 weeks prior and morning of surgery)  Per referring provider's last OV note,   \"Macromastia: The patient stated that she has a very large breasts which is contributing to her back pain. She is requesting evaluation for breast reduction surgery. We discussed referral to Plastic surgery and the patient agreed.\"  Last mammo 5/28/24      To PSR to schedule. Next available with Dr. Back for 45 minutes , consult for breast reduction.     no